# Patient Record
Sex: MALE | URBAN - METROPOLITAN AREA
[De-identification: names, ages, dates, MRNs, and addresses within clinical notes are randomized per-mention and may not be internally consistent; named-entity substitution may affect disease eponyms.]

---

## 2022-02-21 ENCOUNTER — OFFICE VISIT (OUTPATIENT)
Dept: URBAN - METROPOLITAN AREA CLINIC 46 | Facility: CLINIC | Age: 22
End: 2022-02-21
Payer: COMMERCIAL

## 2022-02-21 DIAGNOSIS — E10.9 DIABETES MELLITUS TYPE 1 WITHOUT MENTION OF COMPLICATION: Primary | ICD-10-CM

## 2022-02-21 PROCEDURE — 92004 COMPRE OPH EXAM NEW PT 1/>: CPT | Performed by: OPTOMETRIST

## 2022-02-21 ASSESSMENT — INTRAOCULAR PRESSURE
OD: 12
OS: 12

## 2022-02-21 NOTE — IMPRESSION/PLAN
Impression: Diabetes mellitus Type 1 without mention of complication: D62.5. Plan: Diabetes type I: no background retinopathy, no signs of neovascularization noted. Discussed ocular and systemic benefits of blood sugar control.